# Patient Record
Sex: FEMALE | Race: AMERICAN INDIAN OR ALASKA NATIVE
[De-identification: names, ages, dates, MRNs, and addresses within clinical notes are randomized per-mention and may not be internally consistent; named-entity substitution may affect disease eponyms.]

---

## 2017-06-29 ENCOUNTER — HOSPITAL ENCOUNTER (OUTPATIENT)
Dept: HOSPITAL 5 - XRAY | Age: 67
Discharge: HOME | End: 2017-06-29
Attending: INTERNAL MEDICINE
Payer: MEDICARE

## 2017-06-29 DIAGNOSIS — E11.22: ICD-10-CM

## 2017-06-29 DIAGNOSIS — I12.0: ICD-10-CM

## 2017-06-29 DIAGNOSIS — Z87.891: ICD-10-CM

## 2017-06-29 DIAGNOSIS — E78.00: ICD-10-CM

## 2017-06-29 DIAGNOSIS — R06.02: ICD-10-CM

## 2017-06-29 DIAGNOSIS — R05: Primary | ICD-10-CM

## 2017-06-29 DIAGNOSIS — N18.6: ICD-10-CM

## 2017-06-29 PROCEDURE — 71020: CPT

## 2017-06-29 NOTE — XRAY REPORT
PA and lateral chest:



Cough.



The aorta is tortuous. The heart is normal in size. There is no 

vascular congestion. The lungs are clear and well expanded.



Impression:



No acute finding.

## 2017-07-06 ENCOUNTER — HOSPITAL ENCOUNTER (OUTPATIENT)
Dept: HOSPITAL 5 - CT | Age: 67
Discharge: HOME | End: 2017-07-06
Attending: INTERNAL MEDICINE
Payer: MEDICARE

## 2017-07-06 DIAGNOSIS — E78.00: ICD-10-CM

## 2017-07-06 DIAGNOSIS — N18.6: ICD-10-CM

## 2017-07-06 DIAGNOSIS — E11.22: ICD-10-CM

## 2017-07-06 DIAGNOSIS — Z87.891: ICD-10-CM

## 2017-07-06 DIAGNOSIS — I12.0: ICD-10-CM

## 2017-07-06 DIAGNOSIS — M47.896: Primary | ICD-10-CM

## 2017-07-06 PROCEDURE — 72131 CT LUMBAR SPINE W/O DYE: CPT

## 2017-07-06 NOTE — CAT SCAN REPORT
CT scan of lumbar spine:



History: Soft tissue swelling in the back.



Findings:



Normal height of vertebral bodies. Normal height of intervertebral disc 

spaces. Sclerotic articular surfaces at L3-L4, L4-5 and L5-S1. 

Degenerative facet joints at L3-L4, L4-5 and L5-S1.



There is diffuse disc bulge noted at L4-L5 and L5-S1 being more 

pronounced at L4-L5. Bilateral neural foramina narrowing. Severe 

degenerative facet joints at L4-L5 and L5-S1. No obvious central canal 

spinal stenosis. No definite paravertebral mass.



Impression:



Degenerative lumbar spine. 

Diffuse disc bulge L4-L5 and L5-S1 being more pronounced at L4-L5. 

Bilateral neural foramina narrowing. Degenerative facet joints.

## 2020-03-10 ENCOUNTER — HOSPITAL ENCOUNTER (OUTPATIENT)
Dept: HOSPITAL 5 - CATHLABREC | Age: 70
Discharge: HOME | End: 2020-03-10
Attending: INTERNAL MEDICINE
Payer: MEDICARE

## 2020-03-10 VITALS — SYSTOLIC BLOOD PRESSURE: 146 MMHG | DIASTOLIC BLOOD PRESSURE: 71 MMHG

## 2020-03-10 DIAGNOSIS — Z79.899: ICD-10-CM

## 2020-03-10 DIAGNOSIS — I05.8: ICD-10-CM

## 2020-03-10 DIAGNOSIS — Z90.710: ICD-10-CM

## 2020-03-10 DIAGNOSIS — R07.89: Primary | ICD-10-CM

## 2020-03-10 DIAGNOSIS — Z98.890: ICD-10-CM

## 2020-03-10 DIAGNOSIS — Z88.8: ICD-10-CM

## 2020-03-10 DIAGNOSIS — E78.00: ICD-10-CM

## 2020-03-10 DIAGNOSIS — F41.9: ICD-10-CM

## 2020-03-10 DIAGNOSIS — E11.22: ICD-10-CM

## 2020-03-10 DIAGNOSIS — I50.9: ICD-10-CM

## 2020-03-10 DIAGNOSIS — Z99.2: ICD-10-CM

## 2020-03-10 DIAGNOSIS — G47.30: ICD-10-CM

## 2020-03-10 DIAGNOSIS — Z87.891: ICD-10-CM

## 2020-03-10 DIAGNOSIS — I13.2: ICD-10-CM

## 2020-03-10 DIAGNOSIS — Z90.49: ICD-10-CM

## 2020-03-10 DIAGNOSIS — I42.2: ICD-10-CM

## 2020-03-10 DIAGNOSIS — Z76.82: ICD-10-CM

## 2020-03-10 DIAGNOSIS — N18.6: ICD-10-CM

## 2020-03-10 LAB
APTT BLD: 29.7 SEC. (ref 24.2–36.6)
BAND NEUTROPHILS # (MANUAL): 0 K/MM3
BUN SERPL-MCNC: 33 MG/DL (ref 7–17)
BUN/CREAT SERPL: 6 %
CALCIUM SERPL-MCNC: 9.4 MG/DL (ref 8.4–10.2)
HCT VFR BLD CALC: 36.5 % (ref 30.3–42.9)
HEMOLYSIS INDEX: 3
HGB BLD-MCNC: 11.8 GM/DL (ref 10.1–14.3)
INR PPP: 0.88 (ref 0.87–1.13)
MCHC RBC AUTO-ENTMCNC: 32 % (ref 30–34)
MCV RBC AUTO: 86 FL (ref 79–97)
MYELOCYTES # (MANUAL): 0 K/MM3
PLATELET # BLD: 271 K/MM3 (ref 140–440)
PROMYELOCYTES # (MANUAL): 0 K/MM3
RBC # BLD AUTO: 4.24 M/MM3 (ref 3.65–5.03)
TOTAL CELLS COUNTED BLD: 100

## 2020-03-10 PROCEDURE — 85007 BL SMEAR W/DIFF WBC COUNT: CPT

## 2020-03-10 PROCEDURE — 85730 THROMBOPLASTIN TIME PARTIAL: CPT

## 2020-03-10 PROCEDURE — 85025 COMPLETE CBC W/AUTO DIFF WBC: CPT

## 2020-03-10 PROCEDURE — 93458 L HRT ARTERY/VENTRICLE ANGIO: CPT

## 2020-03-10 PROCEDURE — 80048 BASIC METABOLIC PNL TOTAL CA: CPT

## 2020-03-10 PROCEDURE — 36415 COLL VENOUS BLD VENIPUNCTURE: CPT

## 2020-03-10 PROCEDURE — 85610 PROTHROMBIN TIME: CPT

## 2020-03-10 PROCEDURE — 93005 ELECTROCARDIOGRAM TRACING: CPT

## 2020-03-10 PROCEDURE — 93010 ELECTROCARDIOGRAM REPORT: CPT

## 2020-03-10 RX ADMIN — LIDOCAINE HYDROCHLORIDE ONE ML: 20 INJECTION, SOLUTION INFILTRATION; PERINEURAL at 08:41

## 2020-03-10 RX ADMIN — MIDAZOLAM ONE MG: 1 INJECTION INTRAMUSCULAR; INTRAVENOUS at 08:40

## 2020-03-10 RX ADMIN — FENTANYL CITRATE ONE MCG: 50 INJECTION, SOLUTION INTRAMUSCULAR; INTRAVENOUS at 08:40

## 2020-03-10 RX ADMIN — SODIUM CHLORIDE SCH MLS: 0.9 INJECTION, SOLUTION INTRAVENOUS at 08:41

## 2020-03-10 RX ADMIN — MIDAZOLAM ONE MG: 1 INJECTION INTRAMUSCULAR; INTRAVENOUS at 09:01

## 2020-03-10 RX ADMIN — FENTANYL CITRATE ONE MCG: 50 INJECTION, SOLUTION INTRAMUSCULAR; INTRAVENOUS at 09:11

## 2020-03-10 RX ADMIN — SODIUM CHLORIDE SCH MLS/HR: 0.9 INJECTION, SOLUTION INTRAVENOUS at 07:58

## 2020-03-10 RX ADMIN — LIDOCAINE HYDROCHLORIDE ONE ML: 20 INJECTION, SOLUTION INFILTRATION; PERINEURAL at 09:11

## 2020-03-10 RX ADMIN — MIDAZOLAM ONE MG: 1 INJECTION INTRAMUSCULAR; INTRAVENOUS at 09:11

## 2020-03-10 RX ADMIN — FENTANYL CITRATE ONE MCG: 50 INJECTION, SOLUTION INTRAMUSCULAR; INTRAVENOUS at 09:01

## 2020-03-10 NOTE — SHORT STAY SUMMARY
Short Stay Documentation


Date of service: 03/10/20





- History


H&P: obtained from office





- Allergies and Medications


Current Medications: 


                                    Allergies





lisinopril Allergy (Mild, Verified 03/10/20 07:03)


   Unknown


   coughimg 


clonidine Adverse Reaction (Verified 03/10/20 08:05)


   Unknown


   blood pressure dropped 


IV DYE Allergy (Severe, Uncoded 03/10/20 07:03)


   Unknown


   shut down the kidneys 





                                Home Medications











 Medication  Instructions  Recorded  Confirmed  Last Taken  Type


 


Rosuvastatin (Nf) [Crestor] 20 mg PO QHS 01/12/15 03/10/20 03/09/20 History


 


Insulin Detemir (Nf) [Levemir 8 unit SQ QHS #1 ml 01/20/15 03/10/20 03/09/20 Rx





Flextouch (Nf)]     


 


Folic Acid/Vit B Complex and C 0.8 mg PO DAILY 03/10/20 03/10/20 03/09/20 

History





[Renal Vitamin Tablet]     


 


Metoprolol [Lopressor TAB] 50 mg PO BID 03/10/20 03/10/20 03/09/20 History


 


Micardis 80 mg PO DAILY 03/10/20 03/10/20 03/09/20 History


 


Promethazine [Phenergan] 25 mg PO Q6HR PRN 03/10/20 03/10/20 Unknown History


 


amLODIPine [Norvasc] 10 mg PO DAILY 03/10/20 03/10/20 03/09/20 History


 


clonazePAM [ Klonopin] 0.5 mg PO BID PRN 03/10/20 03/10/20 03/09/20 History








Active Medications





Sodium Chloride (Nacl 0.9% 500 Ml)  500 mls @ 50 mls/hr IV AS DIRECT ALVAREZ


   Stop: 03/10/20 17:59


   Last Admin: 03/10/20 08:41 Dose:  50 mls/hr


   Documented by: 











- Brief post op/procedure progress note


Date of procedure: 03/10/20


Pre-op diagnosis: preop


Post-op diagnosis: same


Procedure: 


[facility] - see dictated cath report





Anesthesia: local


Estimated blood loss: none


Condition: stable





- Disposition


Condition at discharge: Good


Disposition: DC-01 TO HOME OR SELFCARE





- Discharge Diagnoses


(1) ESRD (end stage renal disease)


Status: Chronic   





(2) HTN (hypertension)


Status: Chronic   





(3) Normal coronary arteries


Status: Chronic   





(4) Diabetes mellitus


Status: Chronic   





Short Stay Discharge Plan


Activity: advance as tolerated


Diet: low fat, low salt, diabetic, renal


Wound: open to air, keep clean and dry, per your surgeon's advice


Follow up with: 


PRIMARY CARE,MD [Primary Care Provider] - 7 Days

## 2020-03-10 NOTE — CARDIAC CATHERIZATION REPORT
INDICATION FOR PROCEDURE:  The patient is a 70-year-old Afro-American female

with history of hypertension, diabetes mellitus, and on hemodialysis, is

scheduled for a renal transplant.  As a part of the renal transplant cardiac

workup was performed and had a pharmacological stress testing performed on

02/25/2020, which was found to be normal.  However, as a part of the transplant

protocol, she was required to have coronary angiography.  Hence, she is

scheduled for coronary angiography.  The patient is aware of the procedure,

potential complications, and alternatives of therapy available.



The patient is on hemodialysis.  Hence, a right femoral access was obtained. 

The patient is aware of the procedure, potential complications and alternatives

of therapy available.



DESCRIPTION OF PROCEDURE:  The patient was brought to the catheterization

laboratory.  She was evaluated for appropriateness for moderate sedation.  She

was felt to be appropriate candidate and received IV Versed and fentanyl

starting at 9:01 a.m.  Subsequently, she was continuously monitored with pulse

oximetry and EKG monitoring in addition to hemodynamic monitoring. 



 Local anesthesia was given in the right groin area and under fluoroscopic 
guidance and

using 5-[place] micropuncture needle right femoral artery access was obtained and

5-[place]wand[place] sheath was introduced.  A 5-[place]andan multipurpose catheter was used to

obtain the left ventriculogram done in ALONSO projection using hand injection

followed by angiograms of the left coronary artery and right coronary artery in

multiple views.  At the end of the procedure, catheter and sheath were removed. 

The patient tolerated the procedure well.  At the end of the procedure, the

patient is communicating normally and moving all the extremities and breathing

normally.  The patient's sedation started at 9:01 a.m. and monitoring at 9:19

a.m., manual pressure being applied in the right groin after sheath removal.  No

untoward complications were noted.  Following findings were noted.



HEMODYNAMICS:

1.  Opening aortic pressure 173/70, left ventricular pressure 173/25.  No

gradient across the aortic valve.  Estimated ejection fraction more than 65%.

2.  Left ventriculogram done in ALONSO projection showed normal sized left

ventricle, normal contractility.  End-diastolic and systolic volumes are normal.

 Estimated ejection fraction 65-70%.

3.  Right coronary artery dominant vessel arises normally from right coronary

cusp, angiographically smooth and normal.

4.  Left coronary artery arises normally from left coronary cusp.  Left main,

LAD, which curves around the apex and its branches, ramus and its branches and

circumflex artery and its branch are angiographically smooth and normal.

5.  There is moderate amount of mitral annular calcification noted.



FINAL IMPRESSION:

1.  Normal sized left ventricle with normal contractility with elevated

end-diastolic pressure.

2.  Normal coronary anatomy.

3.  Moderate mitral annular calcification noted.

4.  Right femoral artery access was obtained and manual pressure was applied.



No untoward complications noted.  The patient will be continued on risk factor

modification.





DD: 03/10/2020 09:28

DT: 03/10/2020 09:39

JOB# [id number]  [id number]

MARISOL/BLAS HAYNES

## 2021-07-15 ENCOUNTER — HOSPITAL ENCOUNTER (OUTPATIENT)
Dept: HOSPITAL 5 - US | Age: 71
Discharge: HOME | End: 2021-07-15
Attending: SURGERY
Payer: MEDICARE

## 2021-07-15 DIAGNOSIS — R19.09: Primary | ICD-10-CM

## 2021-07-15 DIAGNOSIS — M79.9: ICD-10-CM

## 2021-07-15 PROCEDURE — 76705 ECHO EXAM OF ABDOMEN: CPT

## 2021-07-15 PROCEDURE — 76700 US EXAM ABDOM COMPLETE: CPT

## 2021-07-15 NOTE — ULTRASOUND REPORT
US soft tissue abdomen limited



INDICATION / CLINICAL INFORMATION: SOFT TISSUE DISORDER.



COMPARISON: None available.



FINDINGS:

There is a hyperechoic, mildly complex nodule measuring 1.6 x 2.2 x 1.3 cm within the left inguinal a
ramiro of concern. No evidence of increased peripheral or internal vascularity.



IMPRESSION:

1. Hyperechoic mildly complex nodule within the left inguinal area of concern. This may be a subcutan
eous sebaceous cyst or hematoma.

 



Scribed by: Mary Ellen Gil RDMS, RVT 

Scribed: 7/15/2021 9:24 AM 



 I have reviewed the images, agree with this report, and edited this report as needed.



Signer Name: Julio Raines MD 

Signed: 7/15/2021 10:32 AM

Workstation Name: [email or web address]